# Patient Record
Sex: FEMALE | Race: WHITE | NOT HISPANIC OR LATINO
[De-identification: names, ages, dates, MRNs, and addresses within clinical notes are randomized per-mention and may not be internally consistent; named-entity substitution may affect disease eponyms.]

---

## 2018-11-14 ENCOUNTER — APPOINTMENT (OUTPATIENT)
Dept: SURGERY | Facility: CLINIC | Age: 53
End: 2018-11-14
Payer: COMMERCIAL

## 2018-11-14 VITALS
DIASTOLIC BLOOD PRESSURE: 97 MMHG | BODY MASS INDEX: 45.45 KG/M2 | SYSTOLIC BLOOD PRESSURE: 134 MMHG | HEIGHT: 62 IN | WEIGHT: 247 LBS | HEART RATE: 106 BPM

## 2018-11-14 DIAGNOSIS — Z83.49 FAMILY HISTORY OF OTHER ENDOCRINE, NUTRITIONAL AND METABOLIC DISEASES: ICD-10-CM

## 2018-11-14 DIAGNOSIS — Z87.09 PERSONAL HISTORY OF OTHER DISEASES OF THE RESPIRATORY SYSTEM: ICD-10-CM

## 2018-11-14 PROCEDURE — 99244 OFF/OP CNSLTJ NEW/EST MOD 40: CPT

## 2018-11-18 PROBLEM — Z87.09 HISTORY OF ASTHMA: Status: RESOLVED | Noted: 2018-11-18 | Resolved: 2018-11-18

## 2018-11-18 PROBLEM — Z83.49 FAMILY HISTORY OF THYROID DISEASE: Status: ACTIVE | Noted: 2018-11-18

## 2018-11-18 PROBLEM — Z87.09 HISTORY OF ACUTE BRONCHITIS: Status: RESOLVED | Noted: 2018-11-18 | Resolved: 2018-11-18

## 2018-11-30 ENCOUNTER — RESULT REVIEW (OUTPATIENT)
Age: 53
End: 2018-11-30

## 2018-12-05 ENCOUNTER — RESULT REVIEW (OUTPATIENT)
Age: 53
End: 2018-12-05

## 2018-12-07 ENCOUNTER — OUTPATIENT (OUTPATIENT)
Dept: OUTPATIENT SERVICES | Facility: HOSPITAL | Age: 53
LOS: 1 days | End: 2018-12-07

## 2018-12-07 VITALS
RESPIRATION RATE: 16 BRPM | HEART RATE: 80 BPM | HEIGHT: 61 IN | TEMPERATURE: 98 F | SYSTOLIC BLOOD PRESSURE: 120 MMHG | WEIGHT: 246.04 LBS | OXYGEN SATURATION: 98 % | DIASTOLIC BLOOD PRESSURE: 84 MMHG

## 2018-12-07 DIAGNOSIS — E04.9 NONTOXIC GOITER, UNSPECIFIED: ICD-10-CM

## 2018-12-07 DIAGNOSIS — Z98.89 OTHER SPECIFIED POSTPROCEDURAL STATES: Chronic | ICD-10-CM

## 2018-12-07 DIAGNOSIS — E66.01 MORBID (SEVERE) OBESITY DUE TO EXCESS CALORIES: ICD-10-CM

## 2018-12-07 DIAGNOSIS — Z91.040 LATEX ALLERGY STATUS: ICD-10-CM

## 2018-12-07 DIAGNOSIS — J40 BRONCHITIS, NOT SPECIFIED AS ACUTE OR CHRONIC: ICD-10-CM

## 2018-12-07 DIAGNOSIS — Z98.51 TUBAL LIGATION STATUS: Chronic | ICD-10-CM

## 2018-12-07 DIAGNOSIS — Z90.49 ACQUIRED ABSENCE OF OTHER SPECIFIED PARTS OF DIGESTIVE TRACT: Chronic | ICD-10-CM

## 2018-12-07 DIAGNOSIS — E04.1 NONTOXIC SINGLE THYROID NODULE: ICD-10-CM

## 2018-12-07 LAB
BASOPHILS # BLD AUTO: 0.09 K/UL — SIGNIFICANT CHANGE UP (ref 0–0.2)
BASOPHILS NFR BLD AUTO: 1.5 % — SIGNIFICANT CHANGE UP (ref 0–2)
BLD GP AB SCN SERPL QL: NEGATIVE — SIGNIFICANT CHANGE UP
BUN SERPL-MCNC: 8 MG/DL — SIGNIFICANT CHANGE UP (ref 7–23)
CALCIUM SERPL-MCNC: 9.3 MG/DL — SIGNIFICANT CHANGE UP (ref 8.4–10.5)
CHLORIDE SERPL-SCNC: 107 MMOL/L — SIGNIFICANT CHANGE UP (ref 98–107)
CO2 SERPL-SCNC: 26 MMOL/L — SIGNIFICANT CHANGE UP (ref 22–31)
CREAT SERPL-MCNC: 0.59 MG/DL — SIGNIFICANT CHANGE UP (ref 0.5–1.3)
EOSINOPHIL # BLD AUTO: 0.62 K/UL — HIGH (ref 0–0.5)
EOSINOPHIL NFR BLD AUTO: 10.7 % — HIGH (ref 0–6)
GLUCOSE SERPL-MCNC: 92 MG/DL — SIGNIFICANT CHANGE UP (ref 70–99)
HCT VFR BLD CALC: 43.3 % — SIGNIFICANT CHANGE UP (ref 34.5–45)
HGB BLD-MCNC: 14.2 G/DL — SIGNIFICANT CHANGE UP (ref 11.5–15.5)
IMM GRANULOCYTES # BLD AUTO: 0.01 # — SIGNIFICANT CHANGE UP
IMM GRANULOCYTES NFR BLD AUTO: 0.2 % — SIGNIFICANT CHANGE UP (ref 0–1.5)
LYMPHOCYTES # BLD AUTO: 2.6 K/UL — SIGNIFICANT CHANGE UP (ref 1–3.3)
LYMPHOCYTES # BLD AUTO: 44.7 % — HIGH (ref 13–44)
MCHC RBC-ENTMCNC: 29.5 PG — SIGNIFICANT CHANGE UP (ref 27–34)
MCHC RBC-ENTMCNC: 32.8 % — SIGNIFICANT CHANGE UP (ref 32–36)
MCV RBC AUTO: 89.8 FL — SIGNIFICANT CHANGE UP (ref 80–100)
MONOCYTES # BLD AUTO: 0.33 K/UL — SIGNIFICANT CHANGE UP (ref 0–0.9)
MONOCYTES NFR BLD AUTO: 5.7 % — SIGNIFICANT CHANGE UP (ref 2–14)
NEUTROPHILS # BLD AUTO: 2.17 K/UL — SIGNIFICANT CHANGE UP (ref 1.8–7.4)
NEUTROPHILS NFR BLD AUTO: 37.2 % — LOW (ref 43–77)
NRBC # FLD: 0 — SIGNIFICANT CHANGE UP
PLATELET # BLD AUTO: 186 K/UL — SIGNIFICANT CHANGE UP (ref 150–400)
PMV BLD: 12.1 FL — SIGNIFICANT CHANGE UP (ref 7–13)
POTASSIUM SERPL-MCNC: 4 MMOL/L — SIGNIFICANT CHANGE UP (ref 3.5–5.3)
POTASSIUM SERPL-SCNC: 4 MMOL/L — SIGNIFICANT CHANGE UP (ref 3.5–5.3)
RBC # BLD: 4.82 M/UL — SIGNIFICANT CHANGE UP (ref 3.8–5.2)
RBC # FLD: 12.9 % — SIGNIFICANT CHANGE UP (ref 10.3–14.5)
RH IG SCN BLD-IMP: POSITIVE — SIGNIFICANT CHANGE UP
SODIUM SERPL-SCNC: 144 MMOL/L — SIGNIFICANT CHANGE UP (ref 135–145)
WBC # BLD: 5.82 K/UL — SIGNIFICANT CHANGE UP (ref 3.8–10.5)
WBC # FLD AUTO: 5.82 K/UL — SIGNIFICANT CHANGE UP (ref 3.8–10.5)

## 2018-12-07 NOTE — H&P PST ADULT - PROBLEM SELECTOR PLAN 1
Right Substernal thyroid Lobectomy, possible total 12/18/18.     CBC BMP T&S     EKG done by  Cardiologist 11/29/18, requested on chart.    Pt reports she was referred to Cardiologist by PMD .  Cardiologist ordered Stress and echo to be done 12/12/18. Consult and stress/ echo requested non chart.

## 2018-12-07 NOTE — H&P PST ADULT - PMH
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

## 2018-12-07 NOTE — H&P PST ADULT - NSANTHOSAYNRD_GEN_A_CORE
No. MILADIS screening performed.  STOP BANG Legend: 0-2 = LOW Risk; 3-4 = INTERMEDIATE Risk; 5-8 = HIGH Risk

## 2018-12-07 NOTE — H&P PST ADULT - PROBLEM SELECTOR PLAN 2
BMI 46.5, discussed with Dr. Vaughn.  Case to be moved to main hospital. Left message with Dr. Nemo Mcneill's office    MILADIS precautions POR booking informed

## 2018-12-07 NOTE — H&P PST ADULT - PROBLEM SELECTOR PLAN 3
Pt on day 8/10 Augmentin for Bronchitis.  Cough persists, improving.  Pt has appt next week with PMD for follow-up and pre-op eval. Requested on chart

## 2018-12-07 NOTE — H&P PST ADULT - ENMT COMMENTS
difficulty swallowing Oct. 2018. (this resolved per pt).  Pt was seen by Endocrinologist, dx with thyroid nodule, inconclusive on biopsy.  Now scheduled for Right Substernal thyroid Lobectomy, possible total 12/18/18.

## 2018-12-07 NOTE — H&P PST ADULT - ASSESSMENT
53 y/0 female reports she noticed difficulty swallowing Oct. 2018. Pt was seen by Endocrinologist, dx with thyroid nodule, inconclusive on biopsy.  Now scheduled for Right Substernal thyroid Lobectomy, possible total 12/18/18.

## 2018-12-07 NOTE — H&P PST ADULT - RS GEN PE MLT RESP DETAILS PC
breath sounds equal/respirations non-labored/no rhonchi/no wheezes/clear to auscultation bilaterally/no rales

## 2018-12-07 NOTE — H&P PST ADULT - RESPIRATORY AND THORAX COMMENTS
Pt with hx of bronchitis.  presently on day 7/10 Augmentin.  pt reports cough persists, but improving.  Expectorates white sputum.  pt reports she has appt with PMD for pre-op medical eval next week Pt with hx of bronchitis.  Presently on day 7/10 Augmentin.  Pt reports cough persists, but improving.  Expectorates white sputum.  Pt reports she has appt with PMD for pre-op medical eval next week

## 2018-12-07 NOTE — H&P PST ADULT - FAMILY HISTORY
Father  Still living? No  Family history of cirrhosis of liver, Age at diagnosis: Age Unknown     Sibling  Still living? Yes, Estimated age: Age Unknown  Family history of thyroid disease, Age at diagnosis: Age Unknown  Family history of oral cancer, Age at diagnosis: Age Unknown

## 2018-12-07 NOTE — H&P PST ADULT - CARDIOVASCULAR COMMENTS
Pt reports she had abnormal EKG 11/29/18 at PMD office.  She was sent to Cardiologist for eval.  per pt, EKG was repeated and WNL.  Pt is scheduled to have Stress, echo on 12/12/18.

## 2018-12-07 NOTE — H&P PST ADULT - PSH
Fibula fracture  s/p orif  Gallbladder & bile duct stone  May   H/O hernia repair    H/O:     Hernia  umbilical hernia repair May 31 ' 2012  History of cholecystectomy    Menorrhagia  s/p endometrial ablation  S/P  Section  x 4 Fibula fracture  s/p orif  Gallbladder & bile duct stone  May   H/O hernia repair    H/O:     Hernia  umbilical hernia repair May 31 ' 2012  History of bilateral tubal ligation  2002  History of cholecystectomy    Menorrhagia  s/p endometrial ablation  S/P  Section  x 4

## 2018-12-13 ENCOUNTER — RESULT REVIEW (OUTPATIENT)
Age: 53
End: 2018-12-13

## 2018-12-18 ENCOUNTER — APPOINTMENT (OUTPATIENT)
Dept: SURGERY | Facility: HOSPITAL | Age: 53
End: 2018-12-18

## 2019-01-10 PROBLEM — E04.1 NONTOXIC SINGLE THYROID NODULE: Chronic | Status: ACTIVE | Noted: 2018-12-07

## 2019-01-10 PROBLEM — J40 BRONCHITIS, NOT SPECIFIED AS ACUTE OR CHRONIC: Chronic | Status: ACTIVE | Noted: 2018-12-07

## 2019-01-10 PROBLEM — E66.01 MORBID (SEVERE) OBESITY DUE TO EXCESS CALORIES: Chronic | Status: ACTIVE | Noted: 2018-12-07

## 2019-01-14 ENCOUNTER — APPOINTMENT (OUTPATIENT)
Dept: SURGERY | Facility: CLINIC | Age: 54
End: 2019-01-14
Payer: COMMERCIAL

## 2019-01-14 PROCEDURE — 99214 OFFICE O/P EST MOD 30 MIN: CPT

## 2019-01-14 NOTE — HISTORY OF PRESENT ILLNESS
[de-identified] : Patient referred by Dr. Shay for evaluation of suspicious thyroid nodule. Patient with recently noted right neck mass. Thyroid ultrasound October 26, 2018: Right lobe 5 x 3.4 x 2.7 CM with mid 3.2 x 2.9 x 2 CM nodule. Left lobe 3.8 x 1.6 x 1.4 CM multiple subcentimeter nodules. TSH 4.1. Biopsy right nodule indeterminate cytology, Afirma pending.  Patient denies dysphagia, change in voice or radiation exposure. Surgery was canceled prior due to elevated LFTs, improving, vesna 12/2018 with ast 34, alt 66.  hep a antibodies elevated, repeat pending.  denies jaundice, bronchitis resolved.

## 2019-01-14 NOTE — PHYSICAL EXAM
[de-identified] : no cervical or supraclavicular adenopathy, trachea midline, neck short, thick with right thyroid nodule 3 cm smooth nontender.  [Normal] : orientation to person, place, and time: normal

## 2019-01-14 NOTE — ASSESSMENT
[FreeTextEntry1] : Patient with suspicious right thyroid nodule. I have recommended a right thyroid lobectomy and total thyroidectomy if metastatic lymph node identified.I have discussed the risks, benefits and alternative treatments which include but are not limited to bleeding, infection, numbness, hoarseness, hypocalcemia, scarring, and need for reoperation. I have answered the patient's questions. They will contact my office to schedule surgery.

## 2019-01-14 NOTE — REASON FOR VISIT
[Follow-Up: _____] : a [unfilled] follow-up visit [FreeTextEntry2] : follicular neoplasm [Other: _____] : [unfilled]

## 2019-01-22 ENCOUNTER — OUTPATIENT (OUTPATIENT)
Dept: OUTPATIENT SERVICES | Facility: HOSPITAL | Age: 54
LOS: 1 days | End: 2019-01-22

## 2019-01-22 VITALS
SYSTOLIC BLOOD PRESSURE: 120 MMHG | TEMPERATURE: 98 F | HEART RATE: 70 BPM | WEIGHT: 233.03 LBS | RESPIRATION RATE: 16 BRPM | HEIGHT: 62 IN | DIASTOLIC BLOOD PRESSURE: 90 MMHG | OXYGEN SATURATION: 99 %

## 2019-01-22 DIAGNOSIS — T65.811A TOXIC EFFECT OF LATEX, ACCIDENTAL (UNINTENTIONAL), INITIAL ENCOUNTER: ICD-10-CM

## 2019-01-22 DIAGNOSIS — D49.7 NEOPLASM OF UNSPECIFIED BEHAVIOR OF ENDOCRINE GLANDS AND OTHER PARTS OF NERVOUS SYSTEM: ICD-10-CM

## 2019-01-22 DIAGNOSIS — E04.1 NONTOXIC SINGLE THYROID NODULE: ICD-10-CM

## 2019-01-22 DIAGNOSIS — E66.9 OBESITY, UNSPECIFIED: ICD-10-CM

## 2019-01-22 DIAGNOSIS — Z90.49 ACQUIRED ABSENCE OF OTHER SPECIFIED PARTS OF DIGESTIVE TRACT: Chronic | ICD-10-CM

## 2019-01-22 DIAGNOSIS — Z98.89 OTHER SPECIFIED POSTPROCEDURAL STATES: Chronic | ICD-10-CM

## 2019-01-22 DIAGNOSIS — Z98.51 TUBAL LIGATION STATUS: Chronic | ICD-10-CM

## 2019-01-22 RX ORDER — ZINC SULFATE TAB 220 MG (50 MG ZINC EQUIVALENT) 220 (50 ZN) MG
0 TAB ORAL
Qty: 0 | Refills: 0 | COMMUNITY

## 2019-01-22 RX ORDER — CHOLECALCIFEROL (VITAMIN D3) 125 MCG
0 CAPSULE ORAL
Qty: 0 | Refills: 0 | COMMUNITY

## 2019-01-22 RX ORDER — SODIUM CHLORIDE 9 MG/ML
3 INJECTION INTRAMUSCULAR; INTRAVENOUS; SUBCUTANEOUS EVERY 8 HOURS
Qty: 0 | Refills: 0 | Status: DISCONTINUED | OUTPATIENT
Start: 2019-01-29 | End: 2019-02-13

## 2019-01-22 RX ORDER — GARLIC 1000 MG
0 CAPSULE ORAL
Qty: 0 | Refills: 0 | COMMUNITY

## 2019-01-22 NOTE — H&P PST ADULT - PSH
Fibula fracture  s/p orif  Gallbladder & bile duct stone  May   H/O hernia repair    H/O:     Hernia  umbilical hernia repair May 31 ' 2012  History of bilateral tubal ligation  2002  History of cholecystectomy    Menorrhagia  s/p endometrial ablation  S/P  Section  x 4

## 2019-01-22 NOTE — H&P PST ADULT - HISTORY OF PRESENT ILLNESS
52yo female reports Thyroid nodule discovered 10/2018 and biopsy showed  inconclusive results.  Pt reports she underwent presurgical testing in 12/2018 but due to abnormal blood results procedure was postponed. Pt return for presurgical evaluation for Right Thyroid Lobectomy, Possible Total scheduled for 1/29/2019

## 2019-01-22 NOTE — H&P PST ADULT - PROBLEM SELECTOR PLAN 1
Right Thyroid Lobectomy, Possible Total scheduled for 1/29/2019 Right Thyroid Lobectomy, Possible Total scheduled for 1/29/2019  Pre-op instructions given. Pt verbalized understanding  Pepcid given for GI prophylaxis  Chlorhexidine wash instructions given  Pending: Medical clearance - requested by surgeon

## 2019-01-22 NOTE — H&P PST ADULT - NEGATIVE ENMT SYMPTOMS
no hearing difficulty/no tinnitus/no ear pain/no sinus symptoms/no vertigo/no throat pain/no dysphagia

## 2019-01-22 NOTE — H&P PST ADULT - PMH
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

## 2019-01-22 NOTE — H&P PST ADULT - MUSCULOSKELETAL
negative detailed exam ROM intact/normal strength/no joint swelling/no joint erythema/no joint warmth/no calf tenderness

## 2019-01-25 PROBLEM — T65.811A: Chronic | Status: ACTIVE | Noted: 2019-01-22

## 2019-01-28 ENCOUNTER — TRANSCRIPTION ENCOUNTER (OUTPATIENT)
Age: 54
End: 2019-01-28

## 2019-01-29 ENCOUNTER — APPOINTMENT (OUTPATIENT)
Dept: SURGERY | Facility: HOSPITAL | Age: 54
End: 2019-01-29

## 2019-01-29 ENCOUNTER — OUTPATIENT (OUTPATIENT)
Dept: OUTPATIENT SERVICES | Facility: HOSPITAL | Age: 54
LOS: 1 days | Discharge: HOME | End: 2019-01-29
Payer: COMMERCIAL

## 2019-01-29 VITALS
OXYGEN SATURATION: 93 % | TEMPERATURE: 99 F | SYSTOLIC BLOOD PRESSURE: 141 MMHG | HEART RATE: 96 BPM | RESPIRATION RATE: 18 BRPM | DIASTOLIC BLOOD PRESSURE: 82 MMHG

## 2019-01-29 VITALS
OXYGEN SATURATION: 99 % | DIASTOLIC BLOOD PRESSURE: 87 MMHG | TEMPERATURE: 98 F | WEIGHT: 233.03 LBS | RESPIRATION RATE: 15 BRPM | HEART RATE: 77 BPM | HEIGHT: 62 IN | SYSTOLIC BLOOD PRESSURE: 146 MMHG

## 2019-01-29 DIAGNOSIS — Z98.89 OTHER SPECIFIED POSTPROCEDURAL STATES: Chronic | ICD-10-CM

## 2019-01-29 DIAGNOSIS — D49.7 NEOPLASM OF UNSPECIFIED BEHAVIOR OF ENDOCRINE GLANDS AND OTHER PARTS OF NERVOUS SYSTEM: ICD-10-CM

## 2019-01-29 DIAGNOSIS — Z98.51 TUBAL LIGATION STATUS: Chronic | ICD-10-CM

## 2019-01-29 DIAGNOSIS — Z90.49 ACQUIRED ABSENCE OF OTHER SPECIFIED PARTS OF DIGESTIVE TRACT: Chronic | ICD-10-CM

## 2019-01-29 PROCEDURE — 60220 PARTIAL REMOVAL OF THYROID: CPT

## 2019-01-29 RX ORDER — MILK THISTLE 180 MG
0 CAPSULE ORAL
Qty: 0 | Refills: 0 | COMMUNITY

## 2019-01-29 RX ORDER — SODIUM CHLORIDE 9 MG/ML
1000 INJECTION, SOLUTION INTRAVENOUS
Qty: 0 | Refills: 0 | Status: DISCONTINUED | OUTPATIENT
Start: 2019-01-29 | End: 2019-01-29

## 2019-01-29 RX ORDER — HYDROMORPHONE HYDROCHLORIDE 2 MG/ML
0.5 INJECTION INTRAMUSCULAR; INTRAVENOUS; SUBCUTANEOUS
Qty: 0 | Refills: 0 | Status: DISCONTINUED | OUTPATIENT
Start: 2019-01-29 | End: 2019-01-29

## 2019-01-29 RX ORDER — CHOLECALCIFEROL (VITAMIN D3) 125 MCG
1 CAPSULE ORAL
Qty: 0 | Refills: 0 | COMMUNITY

## 2019-01-29 RX ORDER — FENTANYL CITRATE 50 UG/ML
25 INJECTION INTRAVENOUS
Qty: 0 | Refills: 0 | Status: DISCONTINUED | OUTPATIENT
Start: 2019-01-29 | End: 2019-01-29

## 2019-01-29 RX ORDER — ONDANSETRON 8 MG/1
4 TABLET, FILM COATED ORAL ONCE
Qty: 0 | Refills: 0 | Status: DISCONTINUED | OUTPATIENT
Start: 2019-01-29 | End: 2019-01-29

## 2019-01-29 RX ORDER — HYDROMORPHONE HYDROCHLORIDE 2 MG/ML
1 INJECTION INTRAMUSCULAR; INTRAVENOUS; SUBCUTANEOUS ONCE
Qty: 0 | Refills: 0 | Status: DISCONTINUED | OUTPATIENT
Start: 2019-01-29 | End: 2019-01-29

## 2019-01-29 RX ORDER — BENZOCAINE AND MENTHOL 5; 1 G/100ML; G/100ML
1 LIQUID ORAL THREE TIMES A DAY
Qty: 0 | Refills: 0 | Status: DISCONTINUED | OUTPATIENT
Start: 2019-01-29 | End: 2019-02-13

## 2019-01-29 RX ORDER — SODIUM CHLORIDE 9 MG/ML
1000 INJECTION, SOLUTION INTRAVENOUS
Qty: 0 | Refills: 0 | Status: DISCONTINUED | OUTPATIENT
Start: 2019-01-29 | End: 2019-02-13

## 2019-01-29 RX ADMIN — HYDROMORPHONE HYDROCHLORIDE 0.5 MILLIGRAM(S): 2 INJECTION INTRAMUSCULAR; INTRAVENOUS; SUBCUTANEOUS at 16:53

## 2019-01-29 RX ADMIN — SODIUM CHLORIDE 50 MILLILITER(S): 9 INJECTION, SOLUTION INTRAVENOUS at 16:55

## 2019-01-29 RX ADMIN — HYDROMORPHONE HYDROCHLORIDE 0.5 MILLIGRAM(S): 2 INJECTION INTRAMUSCULAR; INTRAVENOUS; SUBCUTANEOUS at 17:17

## 2019-01-29 NOTE — ASU DISCHARGE PLAN (ADULT/PEDIATRIC). - NURSING INSTRUCTIONS
Watch for signs of infection; redness, swelling, fever, chills or heat, report such symptoms to the MD. Do not remove neck dressing. Shower as directed by MD, do not scrub site, allow water to run over incision & pat dry. Do not apply any lotions, ointments or powders to incision. No heavy lifting.  Drink 6-8 glasses of fluids daily to promote hydration. No heavy lifting, pulling or pushing heavy objects. Follow up with primary & surgical MD.

## 2019-01-29 NOTE — ASU DISCHARGE PLAN (ADULT/PEDIATRIC). - ITEMS TO FOLLOWUP WITH YOUR PHYSICIAN'S
Please follow up with Dr. Chester in the office in about 1 week.  Please call the office to schedule an appointment.

## 2019-01-29 NOTE — BRIEF OPERATIVE NOTE - PROCEDURE
<<-----Click on this checkbox to enter Procedure Right thyroid lobectomy  01/29/2019    [facility]

## 2019-01-29 NOTE — ASU DISCHARGE PLAN (ADULT/PEDIATRIC). - INSTRUCTIONS
Be sure to drink plenty of fluids to stay hydrated after your surgery About 1 week, call for appointment

## 2019-01-29 NOTE — ASU DISCHARGE PLAN (ADULT/PEDIATRIC). - NOTIFY
Fever greater than 101/Pain not relieved by Medications/Inability to Tolerate Liquids or Foods/Bleeding that does not stop Pain not relieved by Medications/Swelling that continues/Fever greater than 101/Inability to Tolerate Liquids or Foods/Persistent Nausea and Vomiting/Bleeding that does not stop

## 2019-01-29 NOTE — ASU DISCHARGE PLAN (ADULT/PEDIATRIC). - MEDICATION SUMMARY - MEDICATIONS TO STOP TAKING
I will STOP taking the medications listed below when I get home from the hospital:    tumeric  -- 1 tab(s) by mouth once a day last dose 1/21/2019    calcium  -- 1 tab(s) by mouth once a day    Milk Thistle  -- 1 dropper oral daily  last dose 1/21/2019

## 2019-01-29 NOTE — ASU DISCHARGE PLAN (ADULT/PEDIATRIC). - MEDICATION SUMMARY - MEDICATIONS TO TAKE
I will START or STAY ON the medications listed below when I get home from the hospital:    Vitamin D3  -- 1 tab(s) by mouth once a day  -- Indication: For Home Medication

## 2019-01-29 NOTE — ASU DISCHARGE PLAN (ADULT/PEDIATRIC). - CONDITIONS AT DISCHARGE
Alert & oriented. Out of chair ambulating. Tolerating diet without nausea or vomiting. Voiding without difficulty. Vitals stable, afebrile. Understands all discharge instruction & will follow up with the MD. Time allowed for questions.

## 2019-01-30 ENCOUNTER — RESULT REVIEW (OUTPATIENT)
Age: 54
End: 2019-01-30

## 2019-01-30 PROCEDURE — 88307 TISSUE EXAM BY PATHOLOGIST: CPT | Mod: 26

## 2019-02-01 LAB — SURGICAL PATHOLOGY STUDY: SIGNIFICANT CHANGE UP

## 2019-02-04 ENCOUNTER — APPOINTMENT (OUTPATIENT)
Dept: SURGERY | Facility: CLINIC | Age: 54
End: 2019-02-04
Payer: COMMERCIAL

## 2019-02-04 PROCEDURE — 99024 POSTOP FOLLOW-UP VISIT: CPT

## 2019-02-04 NOTE — HISTORY OF PRESENT ILLNESS
[de-identified] : Patient referred by Dr. Shay for evaluation of suspicious thyroid nodule. Patient with recently noted right neck mass. Thyroid ultrasound October 26, 2018: Right lobe 5 x 3.4 x 2.7 CM with mid 3.2 x 2.9 x 2 CM nodule. Left lobe 3.8 x 1.6 x 1.4 CM multiple subcentimeter nodules. TSH 4.1. Biopsy right nodule indeterminate cytology, Afirma pending.  Patient denies dysphagia, change in voice or radiation exposure. Surgery was canceled prior due to elevated LFTs, improving, vesna 12/2018 with ast 34, alt 66.  hep a antibodies elevated, repeat pending.  denies jaundice, bronchitis resolved. \par 1/29/19 right thyroid lobectomy pathology benign, denies dysphagia, hoarseness or pain.

## 2019-02-04 NOTE — PHYSICAL EXAM
[de-identified] : no cervical or supraclavicular adenopathy, trachea midline, neck short, incsiion healing with mild swelling, scar min disscussed [Normal] : orientation to person, place, and time: normal

## 2019-02-04 NOTE — ASSESSMENT
[FreeTextEntry1] : Patient with suspicious right thyroid nodule.s/p right lobectomy with benign, path, seeing Dr Sargent one month, rto  4mo

## 2019-06-05 ENCOUNTER — APPOINTMENT (OUTPATIENT)
Dept: SURGERY | Facility: CLINIC | Age: 54
End: 2019-06-05
Payer: COMMERCIAL

## 2019-06-05 DIAGNOSIS — D34 BENIGN NEOPLASM OF THYROID GLAND: ICD-10-CM

## 2019-06-05 DIAGNOSIS — E04.1 NONTOXIC SINGLE THYROID NODULE: ICD-10-CM

## 2019-06-05 DIAGNOSIS — D49.7 NEOPLASM OF UNSPECIFIED BEHAVIOR OF ENDOCRINE GLANDS AND OTHER PARTS OF NERVOUS SYSTEM: ICD-10-CM

## 2019-06-05 PROCEDURE — 99213 OFFICE O/P EST LOW 20 MIN: CPT

## 2019-06-05 NOTE — HISTORY OF PRESENT ILLNESS
[de-identified] : Patient referred by Dr. Shay for evaluation of suspicious thyroid nodule. Patient with recently noted right neck mass. Thyroid ultrasound October 26, 2018: Right lobe 5 x 3.4 x 2.7 CM with mid 3.2 x 2.9 x 2 CM nodule. Left lobe 3.8 x 1.6 x 1.4 CM multiple subcentimeter nodules. TSH 4.1. Biopsy right nodule indeterminate cytology, Afirma pending.  Patient denies dysphagia, change in voice or radiation exposure. Surgery was canceled prior due to elevated LFTs, improving, vesna 12/2018 with ast 34, alt 66.  hep a antibodies elevated, repeat pending.  denies jaundice, bronchitis resolved. \par 1/29/19 right thyroid lobectomy pathology benign, denies dysphagia, hoarseness or pain. now on synthorid 88

## 2019-06-05 NOTE — ASSESSMENT
[FreeTextEntry1] : Patient with benign right thyroid nodule.will continue under care of Endo and return as needed.

## 2019-06-05 NOTE — PHYSICAL EXAM
[de-identified] : no cervical or supraclavicular adenopathy, trachea midline, neck short, incsiion healing well, scar min disucssed [Normal] : orientation to person, place, and time: normal

## 2019-09-17 ENCOUNTER — TRANSCRIPTION ENCOUNTER (OUTPATIENT)
Age: 54
End: 2019-09-17

## 2019-11-27 PROBLEM — E04.1 RIGHT THYROID NODULE: Status: RESOLVED | Noted: 2018-11-14 | Resolved: 2019-11-27

## 2019-12-20 ENCOUNTER — RESULT REVIEW (OUTPATIENT)
Age: 54
End: 2019-12-20

## 2020-03-12 ENCOUNTER — TRANSCRIPTION ENCOUNTER (OUTPATIENT)
Age: 55
End: 2020-03-12

## 2020-03-30 ENCOUNTER — EMERGENCY (EMERGENCY)
Facility: HOSPITAL | Age: 55
LOS: 1 days | Discharge: HOME | End: 2020-03-30
Attending: EMERGENCY MEDICINE | Admitting: EMERGENCY MEDICINE
Payer: COMMERCIAL

## 2020-03-30 VITALS
RESPIRATION RATE: 20 BRPM | OXYGEN SATURATION: 94 % | DIASTOLIC BLOOD PRESSURE: 71 MMHG | SYSTOLIC BLOOD PRESSURE: 128 MMHG | HEART RATE: 105 BPM | HEIGHT: 62 IN | TEMPERATURE: 102 F | WEIGHT: 240.08 LBS

## 2020-03-30 VITALS
TEMPERATURE: 100 F | OXYGEN SATURATION: 94 % | DIASTOLIC BLOOD PRESSURE: 74 MMHG | RESPIRATION RATE: 22 BRPM | HEART RATE: 88 BPM | SYSTOLIC BLOOD PRESSURE: 131 MMHG

## 2020-03-30 DIAGNOSIS — Z98.89 OTHER SPECIFIED POSTPROCEDURAL STATES: Chronic | ICD-10-CM

## 2020-03-30 DIAGNOSIS — Z90.49 ACQUIRED ABSENCE OF OTHER SPECIFIED PARTS OF DIGESTIVE TRACT: Chronic | ICD-10-CM

## 2020-03-30 DIAGNOSIS — Z98.51 TUBAL LIGATION STATUS: Chronic | ICD-10-CM

## 2020-03-30 PROCEDURE — 71045 X-RAY EXAM CHEST 1 VIEW: CPT | Mod: 26

## 2020-03-30 PROCEDURE — 99283 EMERGENCY DEPT VISIT LOW MDM: CPT

## 2020-03-30 PROCEDURE — 99283 EMERGENCY DEPT VISIT LOW MDM: CPT | Mod: 25

## 2020-03-30 PROCEDURE — 71045 X-RAY EXAM CHEST 1 VIEW: CPT

## 2020-03-30 RX ORDER — ACETAMINOPHEN 500 MG
975 TABLET ORAL ONCE
Refills: 0 | Status: COMPLETED | OUTPATIENT
Start: 2020-03-30 | End: 2020-03-30

## 2020-03-30 RX ORDER — AZITHROMYCIN 500 MG/1
500 TABLET, FILM COATED ORAL ONCE
Refills: 0 | Status: COMPLETED | OUTPATIENT
Start: 2020-03-30 | End: 2020-03-30

## 2020-03-30 RX ORDER — AZITHROMYCIN 500 MG/1
1 TABLET, FILM COATED ORAL
Qty: 4 | Refills: 0
Start: 2020-03-30 | End: 2020-04-02

## 2020-03-30 RX ADMIN — Medication 975 MILLIGRAM(S): at 16:25

## 2020-03-30 RX ADMIN — AZITHROMYCIN 500 MILLIGRAM(S): 500 TABLET, FILM COATED ORAL at 17:49

## 2020-03-30 NOTE — ED ADULT NURSE NOTE - CAS ELECT INFOMATION PROVIDED
DC instructions/Pt received d/c instructions and verbalizes d/c instructions. AAOX3. Ambulating well. VSS. Pt states " I am ready to go home now".

## 2020-03-30 NOTE — ED PROVIDER NOTE - PROGRESS NOTE DETAILS
Reevaluated patient at bedside.  Patient feeling well.  Discussed the results of CXR and copy of report given.   An opportunity to ask questions was given.  Discussed the importance of prompt, close medical follow-up.  Patient will return with any changes, concerns or persistent / worsening symptoms.  Understanding of all instructions verbalized.

## 2020-03-30 NOTE — ED PROVIDER NOTE - CARE PROVIDER_API CALL
Andry Mcqueen)  Internal Medicine  [address] [place] [address]  Phone: [phone]  Fax: [phone]  Follow Up Time: 1-3 Days

## 2020-03-30 NOTE — ED PROVIDER NOTE - CPE EDP ENMT NORM
Anesthesia Evaluation     Patient summary reviewed and Nursing notes reviewed      Airway   Mallampati: II  Dental      Pulmonary - negative pulmonary ROS    breath sounds clear to auscultation  Cardiovascular     Rhythm: regular    (+) hypertension,       Neuro/Psych- negative ROS  GI/Hepatic/Renal/Endo    (+)  GERD, hypothyroidism,     Musculoskeletal (-) negative ROS    Abdominal    Substance History - negative use     OB/GYN negative ob/gyn ROS         Other                                    Anesthesia Plan    ASA 3     MAC     intravenous induction   Anesthetic plan and risks discussed with patient.      
normal...

## 2020-03-30 NOTE — ED PROVIDER NOTE - OBJECTIVE STATEMENT
53 y/o female with PMHx of Hepatitis, HLD presents to ED c/o SOB. Patient states she was diagnosed with flu on 3/12. Began to improve over a week, then she reports a worsening cough and fevers. States she was tested for COVID on 3/22, test results came back positive. Patient reports she has continued fevers, body aches, and a cough minimally productive sometimes causing her to feel like she cant clear the phlegm from her throat. No HA, vomiting, rash, photophobia, abd pain. Patient relieves symptoms with Tylenol, but she did not take today    PMD: Tucker 55 y/o female with PMHx of Hepatitis, HLD presents to ED c/o SOB. Patient states she was diagnosed with flu on 3/12. Began to improve over a week, then she reports a worsening cough and fevers. States she was tested for COVID on 3/22, test results came back positive. Patient reports she has continued fevers, body aches, and a cough minimally productive sometimes causing her to feel like she cant clear the phlegm from her throat. No HA, vomiting, rash, photophobia, abd pain. Patient relieves symptoms with Tylenol, but she did not take today    PMD: Nikolai

## 2020-03-30 NOTE — ED ADULT NURSE NOTE - OBJECTIVE STATEMENT
55 y/o female presenting to ED c/o SOB & fevers. +COVID on 3/22 reports tylenol has helped her symptoms, but she took no tylenol today. +fevers, body aches. Pt denies headache, dizziness, chest pain, palpitations, cough, SOB, abdominal pain, n/v/d, urinary symptoms, weakness at this time. A&Ox4 gross neuro intact, lungs cta bilaterally, no difficulty speaking in complete sentences, dry nonproductive cough present, s1s2 heart sounds heard, pulses x 4, cartwright x4, abdomen soft nontender nondistended, skin intact. Safety and comfort measures maintained. Patient undressed and placed into gown, call bell in hand and side rails up for safety. warm blanket provided, vital signs stable, pt in no acute distress.

## 2020-03-30 NOTE — ED ADULT TRIAGE NOTE - CHIEF COMPLAINT QUOTE
shortness of breath, fever for 3 days, COVID + shortness of breath, fever for 3 days, +COVID, NO tylenol today

## 2020-03-30 NOTE — ED PROVIDER NOTE - PATIENT PORTAL LINK FT
You can access the FollowMyHealth Patient Portal offered by [facility] by registering at the following website: [email or web address]. By joining [email or web address]’s FollowMyHealth portal, you will also be able to view your health information using other applications (apps) compatible with our system.

## 2020-04-11 ENCOUNTER — TRANSCRIPTION ENCOUNTER (OUTPATIENT)
Age: 55
End: 2020-04-11

## 2020-09-03 ENCOUNTER — TRANSCRIPTION ENCOUNTER (OUTPATIENT)
Age: 55
End: 2020-09-03

## 2021-01-14 ENCOUNTER — RESULT REVIEW (OUTPATIENT)
Age: 56
End: 2021-01-14

## 2021-04-23 ENCOUNTER — TRANSCRIPTION ENCOUNTER (OUTPATIENT)
Age: 56
End: 2021-04-23

## 2021-07-20 NOTE — H&P PST ADULT - VENOUS THROMBOEMBOLISM
Patient resting in bed with eye closed. IV fluids infusing into Left AC. Patient denies pain, nausea, or vomiting. VSS. Shift uneventful. Fall precautions in place, call light within reach, and bedside table nearby. Will continue to monitor.      Electronically signed by Ivan Soliz RN on 7/20/2021 at 6:21 PM no

## 2022-01-22 ENCOUNTER — TRANSCRIPTION ENCOUNTER (OUTPATIENT)
Age: 57
End: 2022-01-22

## 2022-03-15 ENCOUNTER — RESULT REVIEW (OUTPATIENT)
Age: 57
End: 2022-03-15

## 2022-04-06 ENCOUNTER — TRANSCRIPTION ENCOUNTER (OUTPATIENT)
Age: 57
End: 2022-04-06

## 2023-08-01 ENCOUNTER — NON-APPOINTMENT (OUTPATIENT)
Age: 58
End: 2023-08-01

## 2023-10-05 NOTE — H&P PST ADULT - CLICK TO LAUNCH ORM
This patient is being considered for a neuraxial injection for the management of their pain. However, the patient is currently on an anticoagulant per your orders. The [employer] Guideline on neuraxial procedures and anti-coagulation indicates that medication should be held as follows: Eliquis 3 days prior to injection. Please advise if you ok the hold of this medication.     Thank you,    Julia Askew  Procedure   [facility] .

## 2024-01-19 ENCOUNTER — NON-APPOINTMENT (OUTPATIENT)
Age: 59
End: 2024-01-19

## 2025-03-02 ENCOUNTER — NON-APPOINTMENT (OUTPATIENT)
Age: 60
End: 2025-03-02
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
